# Patient Record
(demographics unavailable — no encounter records)

---

## 2025-01-15 NOTE — PHYSICAL EXAM
[General Appearance - Well Developed] : well developed [Normal Appearance] : normal appearance [General Appearance - In No Acute Distress] : no acute distress [Neck Cervical Mass (___cm)] : no neck mass was observed [Heart Rate And Rhythm] : heart rate and rhythm were normal [Heart Sounds] : normal S1 and S2 [Respiration, Rhythm And Depth] : normal respiratory rhythm and effort [Exaggerated Use Of Accessory Muscles For Inspiration] : no accessory muscle use [Auscultation Breath Sounds / Voice Sounds] : lungs were clear to auscultation bilaterally [Abdomen Tenderness] : non-tender [Abnormal Walk] : normal gait [Cyanosis, Localized] : no localized cyanosis [Skin Turgor] : normal skin turgor [] : no rash [No Focal Deficits] : no focal deficits [Oriented To Time, Place, And Person] : oriented to person, place, and time [Impaired Insight] : insight and judgment were intact

## 2025-01-15 NOTE — HISTORY OF PRESENT ILLNESS
[Never] : never [Obstructive Sleep Apnea] : obstructive sleep apnea [TextBox_4] : He is a 77 year old man with moderate COPD and JACOBO. He is on CPAP. Has been using albuterol on occasion.  Does yoga every day and goes to the gym as well.  Works out and does the treadmill. He never smoked.  He uses albuterol as needed.  No complaint of shortness of breath, wheezing or chest pain today.  He uses CPAP every night.  He is planning on having hernia surgery in a few weeks. [Awakes Unrefreshed] : does not awaken unrefreshed [Difficulty Maintaining Sleep] : does not have difficulty maintaining sleep [Fatigue] : no fatigue

## 2025-01-15 NOTE — DISCUSSION/SUMMARY
[FreeTextEntry1] : He is a 77 year-old, a never smoker,  with a history of hypertension, BPH, prostate cancer, moderate COPD and JACOBO. He had drainage of a pericardial effusion in May of 2019 for which he had a pericardial window.  The pathology was negative for malignancy.  S/P TURP 9/23/20.  Impression Combined obstructive and restrictive lung disease  -Relatively asymptomatic -On albuterol JACOBO  - on CPAP of 8 cm  Recommend Continue with albuterol as needed -Call me if he starts to use it on a regular basis Maintain on CPAP of 8 cm of water Continue with yoga and the gym Follow-up in 6 months  Cleared for hernia surgery from the pulmonary aspect.

## 2025-01-15 NOTE — PROCEDURE
[FreeTextEntry1] : PFT 7/12/18: There was moderate obstruction. There was also moderate restriction. There was a mild reduction in diffusion. No significant improvement was noted after inhalation of a bronchodilator.  PFT 9/12/19: Moderate obstruction. Moderate restriction also noted. Diffusion was normal. Improvement after bronchodilator noted.   PFT 6/9/21: Moderate obstruction. Severe restriction. Diffusion capacity was normal. Mild improvement after bronchodilator.   PFT 6/9/23: No obstruction. Severe restriction. Diffusion capacity was normal. No significant improvement after bronchodilator.   PFT 1/15/25: Moderate obstruction.  No change after bronchodilator.  Chest x-ray 9/17/19: Moderate cardiomegaly. No evidence of acute disease.  Chest x-ray 1/17/24: Cardiomegaly.  No acute infiltrates.

## 2025-01-15 NOTE — REVIEW OF SYSTEMS
[Fatigue] : no fatigue [Postnasal Drip] : no postnasal drip [Cough] : no cough [Dyspnea] : no dyspnea [Chest Tightness] : no chest tightness [Wheezing] : no wheezing [Chest Discomfort] : no chest discomfort [Edema] : ~T edema was not present [Nasal Discharge] : no nasal discharge [Heartburn] : no heartburn [Rash] : no [unfilled] rash [Anemia] : no anemia [Headache] : no headache [Depression] : no depression [Anxiety] : no anxiety [Diabetes] : no diabetes mellitus [DVT] : no DVT [Hypersomnolence] : not sleeping much more than usual

## 2025-07-16 NOTE — PHYSICAL EXAM
[General Appearance - Well Developed] : well developed [General Appearance - In No Acute Distress] : no acute distress [Neck Cervical Mass (___cm)] : no neck mass was observed [Heart Rate And Rhythm] : heart rate and rhythm were normal [Heart Sounds] : normal S1 and S2 [Respiration, Rhythm And Depth] : normal respiratory rhythm and effort [Auscultation Breath Sounds / Voice Sounds] : lungs were clear to auscultation bilaterally [Abdomen Tenderness] : non-tender [Abnormal Walk] : normal gait [Cyanosis, Localized] : no localized cyanosis [Skin Turgor] : normal skin turgor [No Focal Deficits] : no focal deficits [Oriented To Time, Place, And Person] : oriented to person, place, and time [Impaired Insight] : insight and judgment were intact [] : no respiratory distress

## 2025-07-16 NOTE — DISCUSSION/SUMMARY
[FreeTextEntry1] : He is a 77 year-old, a never smoker,  with a history of hypertension, BPH, prostate cancer, moderate COPD and JACOBO. He had drainage of a pericardial effusion in May of 2019 for which he had a pericardial window.  The pathology was negative for malignancy.  S/P TURP 9/23/20.  Impression Combined obstructive and restrictive lung disease  -Moderate obstruction on PFT -Functional status is good -On albuterol as needed JACOBO  - on CPAP of 8 cm  Recommend Continue with albuterol as needed Follow-up with cardiology -He sees Dr. Mayo Maintain on CPAP of 8 cm of water Weight loss and exercise Continue with yoga and the gym Follow-up in 6 months  Time spent preparing for the visit, interviewing and examining the patient, reviewing data and imaging, discussing the recommendations with the patient and completing documentation was 34 minutes excluding separate billable services.

## 2025-07-16 NOTE — REVIEW OF SYSTEMS
[Fatigue] : no fatigue [Postnasal Drip] : no postnasal drip [Cough] : no cough [Sputum] : not coughing up ~M sputum [Dyspnea] : no dyspnea [Chest Tightness] : no chest tightness [Wheezing] : no wheezing [Chest Discomfort] : no chest discomfort [PND] : no PND [Edema] : ~T edema was not present [Nasal Discharge] : no nasal discharge [Heartburn] : no heartburn [Back Pain] : ~T no back pain [Rash] : no [unfilled] rash [Anemia] : no anemia [Headache] : no headache [Depression] : no depression [Anxiety] : no anxiety [Diabetes] : no diabetes mellitus [DVT] : no DVT [Hypersomnolence] : not sleeping much more than usual

## 2025-07-16 NOTE — HISTORY OF PRESENT ILLNESS
[Never] : never [Obstructive Sleep Apnea] : obstructive sleep apnea [TextBox_4] : 1/15/2025: 77-year-old man with moderate COPD and JACOBO. On CPAP. Has been using albuterol on occasion.  Does yoga every day and goes to the gym as well.  Works out and does the treadmill. He never smoked. He uses albuterol as needed.  No complaint of shortness of breath, wheezing or chest pain today. He uses CPAP every night.  7/16/2025: He came for follow-up today.  He continues to go to the gym 5 to 6 days a week without any trouble.  He also does yoga at home.  He uses albuterol as needed.  No complaints of cough, wheezing or shortness of breath.  Remains on CPAP and uses it every night.  He has been following up with cardiology.  He denied any new medical issues.  He does not smoke. [Awakes Unrefreshed] : does not awaken unrefreshed